# Patient Record
Sex: FEMALE | Race: WHITE | NOT HISPANIC OR LATINO | Employment: STUDENT | URBAN - METROPOLITAN AREA
[De-identification: names, ages, dates, MRNs, and addresses within clinical notes are randomized per-mention and may not be internally consistent; named-entity substitution may affect disease eponyms.]

---
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
## 2022-01-05 ENCOUNTER — APPOINTMENT (OUTPATIENT)
Dept: PLASTIC SURGERY | Facility: CLINIC | Age: 22
End: 2022-01-05

## 2022-01-10 ENCOUNTER — NON-APPOINTMENT (OUTPATIENT)
Age: 22
End: 2022-01-10

## 2022-01-10 ENCOUNTER — APPOINTMENT (OUTPATIENT)
Dept: OPHTHALMOLOGY | Facility: CLINIC | Age: 22
End: 2022-01-10
Payer: COMMERCIAL

## 2022-01-10 PROCEDURE — 99072 ADDL SUPL MATRL&STAF TM PHE: CPT

## 2022-01-10 PROCEDURE — 92014 COMPRE OPH EXAM EST PT 1/>: CPT

## 2023-01-07 ENCOUNTER — HOSPITAL ENCOUNTER (EMERGENCY)
Facility: OTHER | Age: 23
Discharge: HOME | End: 2023-01-07
Attending: EMERGENCY MEDICINE
Payer: COMMERCIAL

## 2023-01-07 VITALS
TEMPERATURE: 99 F | WEIGHT: 118 LBS | DIASTOLIC BLOOD PRESSURE: 81 MMHG | OXYGEN SATURATION: 98 % | RESPIRATION RATE: 18 BRPM | SYSTOLIC BLOOD PRESSURE: 125 MMHG | BODY MASS INDEX: 22.28 KG/M2 | HEART RATE: 74 BPM | HEIGHT: 61 IN

## 2023-01-07 DIAGNOSIS — Z20.3 NEED FOR POST EXPOSURE PROPHYLAXIS FOR RABIES: ICD-10-CM

## 2023-01-07 DIAGNOSIS — T14.8XXA ANIMAL BITE: Primary | ICD-10-CM

## 2023-01-07 PROCEDURE — 90375 RABIES IG IM/SC: CPT | Mod: JG | Performed by: PHYSICIAN ASSISTANT

## 2023-01-07 PROCEDURE — 63600175 PHARM REV CODE 636 W HCPCS: Mod: JG | Performed by: PHYSICIAN ASSISTANT

## 2023-01-07 PROCEDURE — 99284 EMERGENCY DEPT VISIT MOD MDM: CPT

## 2023-01-07 PROCEDURE — 90675 RABIES VACCINE IM: CPT | Mod: JG | Performed by: PHYSICIAN ASSISTANT

## 2023-01-07 PROCEDURE — 90471 IMMUNIZATION ADMIN: CPT | Performed by: PHYSICIAN ASSISTANT

## 2023-01-07 RX ADMIN — RABIES VACCINE 2.5 UNITS: KIT at 10:01

## 2023-01-07 RX ADMIN — RABIES IMMUNE GLOBULIN (HUMAN) 1080 UNITS: 300 INJECTION, SOLUTION INFILTRATION; INTRAMUSCULAR at 10:01

## 2023-01-07 NOTE — Clinical Note
"Eliud Quintanay" Jose was seen and treated in our emergency department on 1/7/2023.  She may return to school on 01/09/2023.      If you have any questions or concerns, please don't hesitate to call.      CLEMENTINE Donnelly"

## 2023-01-08 NOTE — ED PROVIDER NOTES
"  Source of History:  Patient     Chief complaint:  Animal Bite (Pt reports being bit by a Lemur to right hand in [facility] on 1/4/2022. Pt was seen by PCP in home state [place], given TDAP and abx. PCP would like patient to receive Rabies vaccine in ED. Denies pain to area.)      HPI:  Eliud Garcia is a 23 y.o. female presenting with  animal bite to right thumb.  Patient sustained bite from [place] while in [facility] few days ago.  She did note mild redness to the site and was evaluated in updated her tetanus and was started on Augmentin.  She discuss with her primary care and they recommended rabies prophylaxis 0 unsure of they animal status [place] this [place].  Patient denies any complaints at this time.  Patient is on a biologic for her asthma      This is the extent to the patients complaints today here in the emergency department.    ROS: As per HPI and below:  Constitutional: No fever.  No chills.  Eyes: No visual changes.   ENT: No sore throat. No ear pain.  Urinary: No abnormal urination.  MSK:  Animal bite to right thumb  Integument: No rashes or lesions.    Review of patient's allergies indicates:  No Known Allergies    PMH:  As per HPI and below:  Past Medical History:   Diagnosis Date    ADHD (attention deficit hyperactivity disorder)     Anxiety     Asthma     Depression      No past surgical history on file.    Social History     Tobacco Use    Smoking status: Never    Smokeless tobacco: Never       Physical Exam:    /81 (BP Location: Right arm, Patient Position: Sitting)   Pulse 74   Temp 98.9 °F (37.2 °C) (Oral)   Resp 18   Ht 5' 1" (1.549 m)   Wt 53.5 kg (118 lb)   SpO2 98%   BMI 22.30 kg/m²   Nursing note and vital signs reviewed.  Constitutional: No acute distress.  Eyes: No conjunctival injection.  Extraocular muscles are intact.  ENT: Normal phonation.  Musculoskeletal:  Small puncture jazmyne along the dorsal aspect of the right thumb with small area of erythema.  No abscess, fluctuance " or induration.  Full range of motion of all extremities.  Skin: No rashes seen.  Good turgor.  No abrasions.  No ecchymoses.  Psych: Appropriate, conversant.        I decided to obtain the patient's medical records.      Imaging Results    None         MDM:    Eliud Garcia 23 y.o. presented to the ED with c/o animal bite.  Physical exam revealsmall puncture jazmyne along the dorsal aspect of the right thumb with small area of erythema.  No abscess, fluctuance or induration.  Full range of motion of all extremities.    DDX:  And will bite, well-appearing, well-healing, local infection, cellulitis, abscess, rabies exposure    ED management:  Given unknown vaccine status of animal discuss initiating rabies prophylaxis which includes immunoglobulin and vaccine.  Immunoglobulin was injected near the site and this was tolerated well.  Remaining immunoglobulin injected into the deltoid by myself.  Remaining vaccine given by nurse.  Discussed continued following for subsequent vaccines on day 03/07/2014.  Suspect she may need for the 5th dose given her immunocompromise status with a biologic.  Will defer to Infectious Disease and have placed a referral.  She was informed she can likely obtain these vaccines in the vaccine clinic and or with infectious disease.    Impression/Plan: Patient informed of diagnosis  The primary encounter diagnosis was Animal bite. A diagnosis of Need for post exposure prophylaxis for rabies was also pertinent to this visit.  Patient will follow up with Primary.  Patient cautioned on when to return to ED.  Pt. Understands and agrees with current treatment plan                   Diagnostic Impression:    1. Animal bite    2. Need for post exposure prophylaxis for rabies         ED Disposition Condition    Discharge Stable            ED Prescriptions    None       Follow-up Information       Follow up With Specialties Details Why Contact Info Additional Information    [facility] 1st  Fl Infectious Diseases Schedule an appointment as soon as possible for a visit on 1/10/2023 for subsequent vaccinations Juany4 Anibal brijesh  [facility] [id number] [phone] Main Building, 1st floor near [place] entrance Please park in [facility]            Please pardon typos or dictation errors, as this note was transcribed using [employer] direct dictation software.      CLEMENTINE Donnelly  01/09/23 [id number]

## 2023-01-08 NOTE — DISCHARGE INSTRUCTIONS
You will need additional prophylaxis doses on day 3 (1/10), day 7 (1/14) and day 14 (1/21) and to discuss if they recommend 5th dose given your biologic use

## 2023-01-10 ENCOUNTER — TELEPHONE (OUTPATIENT)
Dept: INFECTIOUS DISEASES | Facility: CLINIC | Age: 23
End: 2023-01-10
Payer: COMMERCIAL

## 2023-01-10 ENCOUNTER — CLINICAL SUPPORT (OUTPATIENT)
Dept: INFECTIOUS DISEASES | Facility: CLINIC | Age: 23
End: 2023-01-10
Payer: COMMERCIAL

## 2023-01-10 DIAGNOSIS — Z20.3 NEED FOR POST EXPOSURE PROPHYLAXIS FOR RABIES: Primary | ICD-10-CM

## 2023-01-10 DIAGNOSIS — Z20.3 NEED FOR POST EXPOSURE PROPHYLAXIS FOR RABIES: ICD-10-CM

## 2023-01-10 PROCEDURE — 90675 RABIES VACCINE IM: CPT | Mod: JG,S$GLB,, | Performed by: INTERNAL MEDICINE

## 2023-01-10 PROCEDURE — 90471 RABIES VACCINE IM: ICD-10-PCS | Mod: S$GLB,,, | Performed by: INTERNAL MEDICINE

## 2023-01-10 PROCEDURE — 99999 PR PBB SHADOW E&M-EST. PATIENT-LVL I: ICD-10-PCS | Mod: PBBFAC,,,

## 2023-01-10 PROCEDURE — 90471 IMMUNIZATION ADMIN: CPT | Mod: S$GLB,,, | Performed by: INTERNAL MEDICINE

## 2023-01-10 PROCEDURE — 99999 PR PBB SHADOW E&M-EST. PATIENT-LVL I: CPT | Mod: PBBFAC,,,

## 2023-01-10 PROCEDURE — 90675 RABIES VACCINE IM: ICD-10-PCS | Mod: JG,S$GLB,, | Performed by: INTERNAL MEDICINE

## 2023-01-10 NOTE — PROGRESS NOTES
Patient received the rabies post exposure vaccine in the left deltoid. Pt tolerated well. Pt asked to wait in the clinic 15 minutes after injection in the event of an allergic reaction. Pt verbalized understanding. Pt left in NAD.

## 2023-01-13 ENCOUNTER — CLINICAL SUPPORT (OUTPATIENT)
Dept: INFECTIOUS DISEASES | Facility: CLINIC | Age: 23
End: 2023-01-13
Payer: COMMERCIAL

## 2023-01-13 DIAGNOSIS — Z20.3 NEED FOR POST EXPOSURE PROPHYLAXIS FOR RABIES: ICD-10-CM

## 2023-01-13 PROCEDURE — 90471 RABIES VACCINE IM: ICD-10-PCS | Mod: S$GLB,,, | Performed by: INTERNAL MEDICINE

## 2023-01-13 PROCEDURE — 99999 PR PBB SHADOW E&M-EST. PATIENT-LVL I: CPT | Mod: PBBFAC,,,

## 2023-01-13 PROCEDURE — 90675 RABIES VACCINE IM: CPT | Mod: JG,S$GLB,, | Performed by: INTERNAL MEDICINE

## 2023-01-13 PROCEDURE — 90675 RABIES VACCINE IM: ICD-10-PCS | Mod: JG,S$GLB,, | Performed by: INTERNAL MEDICINE

## 2023-01-13 PROCEDURE — 90471 IMMUNIZATION ADMIN: CPT | Mod: S$GLB,,, | Performed by: INTERNAL MEDICINE

## 2023-01-13 PROCEDURE — 99999 PR PBB SHADOW E&M-EST. PATIENT-LVL I: ICD-10-PCS | Mod: PBBFAC,,,

## 2023-01-13 NOTE — PROGRESS NOTES
Patient received 3rd post rabies vaccine IM to the right deltoid.  Tolerated well an left in NAD

## 2023-01-20 ENCOUNTER — CLINICAL SUPPORT (OUTPATIENT)
Dept: INFECTIOUS DISEASES | Facility: CLINIC | Age: 23
End: 2023-01-20
Payer: COMMERCIAL

## 2023-01-20 DIAGNOSIS — Z20.3 NEED FOR POST EXPOSURE PROPHYLAXIS FOR RABIES: ICD-10-CM

## 2023-01-20 PROCEDURE — 90471 IMMUNIZATION ADMIN: CPT | Mod: S$GLB,,, | Performed by: INTERNAL MEDICINE

## 2023-01-20 PROCEDURE — 99999 PR PBB SHADOW E&M-EST. PATIENT-LVL I: CPT | Mod: PBBFAC,,,

## 2023-01-20 PROCEDURE — 90675 RABIES VACCINE IM: ICD-10-PCS | Mod: JG,S$GLB,, | Performed by: INTERNAL MEDICINE

## 2023-01-20 PROCEDURE — 99999 PR PBB SHADOW E&M-EST. PATIENT-LVL I: ICD-10-PCS | Mod: PBBFAC,,,

## 2023-01-20 PROCEDURE — 90675 RABIES VACCINE IM: CPT | Mod: JG,S$GLB,, | Performed by: INTERNAL MEDICINE

## 2023-01-20 PROCEDURE — 90471 RABIES VACCINE IM: ICD-10-PCS | Mod: S$GLB,,, | Performed by: INTERNAL MEDICINE

## 2023-01-20 NOTE — PROGRESS NOTES
Patient received the Rabies vaccine in the right deltoid. Pt tolerated well. Pt asked to wait in the clinic 15 minutes after injection in the event of an allergic reaction. Pt verbalized understanding. Pt left in NAD.

## 2023-01-31 ENCOUNTER — TELEPHONE (OUTPATIENT)
Dept: EMERGENCY MEDICINE | Facility: OTHER | Age: 23
End: 2023-01-31
Payer: COMMERCIAL

## 2023-02-01 NOTE — TELEPHONE ENCOUNTER
----- Message from Soledad Connelly LPN sent at 1/31/2023  2:15 PM CST -----  She will be coming on Friday for her 28 day post exposure rabies due to being immune compromised.  Can you please place one more order for  rabies vaccine [id number]. Thanks

## 2023-02-10 ENCOUNTER — HOSPITAL ENCOUNTER (EMERGENCY)
Facility: OTHER | Age: 23
Discharge: HOME | End: 2023-02-10
Attending: EMERGENCY MEDICINE
Payer: COMMERCIAL

## 2023-02-10 VITALS
WEIGHT: 118 LBS | BODY MASS INDEX: 22.28 KG/M2 | DIASTOLIC BLOOD PRESSURE: 74 MMHG | OXYGEN SATURATION: 100 % | TEMPERATURE: 98 F | SYSTOLIC BLOOD PRESSURE: 118 MMHG | RESPIRATION RATE: 18 BRPM | HEIGHT: 61 IN | HEART RATE: 77 BPM

## 2023-02-10 DIAGNOSIS — M79.641 RIGHT HAND PAIN: Primary | ICD-10-CM

## 2023-02-10 DIAGNOSIS — S60.221A TRAUMATIC ECCHYMOSIS OF RIGHT HAND, INITIAL ENCOUNTER: ICD-10-CM

## 2023-02-10 LAB
B-HCG UR QL: NEGATIVE
CTP QC/QA: YES

## 2023-02-10 PROCEDURE — 81025 URINE PREGNANCY TEST: CPT | Performed by: NURSE PRACTITIONER

## 2023-02-10 PROCEDURE — 99283 EMERGENCY DEPT VISIT LOW MDM: CPT

## 2023-02-10 PROCEDURE — 25000003 PHARM REV CODE 250: Performed by: NURSE PRACTITIONER

## 2023-02-10 RX ORDER — ACETAMINOPHEN 325 MG/1
650 TABLET ORAL
Status: COMPLETED | OUTPATIENT
Start: 2023-02-10 | End: 2023-02-10

## 2023-02-10 RX ORDER — ACETAMINOPHEN 500 MG
500 TABLET ORAL EVERY 6 HOURS PRN
Qty: 20 TABLET | Refills: 0 | Status: SHIPPED | OUTPATIENT
Start: 2023-02-10

## 2023-02-10 RX ORDER — KETOROLAC TROMETHAMINE 10 MG/1
10 TABLET, FILM COATED ORAL EVERY 6 HOURS
Qty: 15 TABLET | Refills: 0 | Status: SHIPPED | OUTPATIENT
Start: 2023-02-10 | End: 2023-02-13

## 2023-02-10 RX ORDER — KETOROLAC TROMETHAMINE 10 MG/1
10 TABLET, FILM COATED ORAL
Status: COMPLETED | OUTPATIENT
Start: 2023-02-10 | End: 2023-02-10

## 2023-02-10 RX ADMIN — KETOROLAC TROMETHAMINE 10 MG: 10 TABLET, FILM COATED ORAL at 07:02

## 2023-02-10 RX ADMIN — ACETAMINOPHEN 650 MG: 325 TABLET, FILM COATED ORAL at 07:02

## 2023-02-10 NOTE — FIRST PROVIDER EVALUATION
" Emergency Department TeleTriage Encounter Note      CHIEF COMPLAINT    Chief Complaint   Patient presents with    Hand Injury     C/o right hand pain/swelling s/t injury x 1 wks with no improvement. Swelling and bruising noted to right hand. VSS       VITAL SIGNS   Initial Vitals [02/10/23 1623]   BP Pulse Resp Temp SpO2   120/76 71 17 98.2 °F (36.8 °C) 100 %      MAP       --            ALLERGIES    Review of patient's allergies indicates:  No Known Allergies    PROVIDER TRIAGE NOTE  This is a teletriage evaluation of a 23 y.o. female presenting to the ED complaining of right hand injury.  Was playing "bloody knuckles" with a friend and did not realize how much her hand hurt afterwards.  Reports continued pain, swelling, and bruising.     Well-appearing.  Right hand swelling and bruising.     Initial orders will be placed and care will be transferred to an alternate provider when patient is roomed for a full evaluation. Any additional orders and the final disposition will be determined by that provider.         ORDERS  Labs Reviewed   POCT URINE PREGNANCY       ED Orders (720h ago, onward)      Start Ordered     Status Ordering Provider    02/10/23 1630 02/10/23 1629  POCT urine pregnancy  Once         Ordered JUAN LUIS BAUTISTA    02/10/23 1630 02/10/23 1629  X-Ray Hand 3 view Right  1 time imaging         Ordered JUAN LUIS BAUTISTA              Virtual Visit Note: The provider triage portion of this emergency department evaluation and documentation was performed via [employer], a HIPAA-compliant telemedicine application, in concert with a tele-presenter in the room. A face to face patient evaluation with one of my colleagues will occur once the patient is placed in an emergency department room.      DISCLAIMER: This note was prepared with [employer] voice recognition transcription software. Garbled syntax, mangled pronouns, and other bizarre constructions may be attributed to that software " system.

## 2023-02-11 NOTE — ED PROVIDER NOTES
"SCRIBE #1 NOTE: I, Everardo Hauser, am scribing for, and in the presence of,  Sabi Sheriff NP.       Source of History:  The patient.     Chief complaint:  Hand Injury (C/o right hand pain/swelling s/t injury x 1 wks with no improvement. Swelling and bruising noted to right hand. VSS)      HPI:  Eliud Garcia is a 23 y.o. female presenting with pain, swelling, and bruising to the back of her right hand for the past week. The patient states she was playing a drinking game that involved repeatedly hitting knuckles against the the back of her hand until one person gives up. She describes her pain as a constant dull pain but she notes that her hand is very tender to touch. She also states that she feels pain when she moves her fingers. She reports taking 400 mg Ibuprofen with moderate relief.     This is the extent to the patients complaints today here in the emergency department.    ROS: As per HPI and below:  General: No fever.  No chills.  Eyes: No visual changes.  ENT: No sore throat. No ear pain  Head: No headache.    Respiratory: No shortness of breath.  Cardiovascular: No chest pain.  Abdomen: No abdominal pain.  No nausea or vomiting.  Genito-Urinary: No abnormal urination.  Neurologic: No focal weakness.  No numbness.  MSK: No back pain. +Right hand pain and swelling.   Integument: No rashes or lesions. +Bruising to right hand.   Hematologic: No easy bruising.  Endocrine: No excessive thirst or urination.      Review of patient's allergies indicates:  No Known Allergies    PMH:  As per HPI and below:  Past Medical History:   Diagnosis Date    ADHD (attention deficit hyperactivity disorder)     Anxiety     Asthma     Depression      No past surgical history on file.    Social History     Tobacco Use    Smoking status: Never    Smokeless tobacco: Never       Physical Exam:    /76 (BP Location: Left arm, Patient Position: Sitting)   Pulse 71   Temp 98.2 °F (36.8 °C) (Oral)   Resp 17   Ht 5' 1" " (1.549 m)   Wt 53.5 kg (118 lb)   SpO2 100%   BMI 22.30 kg/m²   Nursing note and vital signs reviewed.    Appearance: No acute distress.  Eyes: No conjunctival injection.  Neck: No deformity.   ENT: Oropharynx clear. No stridor.   Chest/ Respiratory: Clear to auscultation bilaterally. Good air movement.  No wheezes.  No rhonchi. No rales. No accessory muscle use.  Cardiovascular: Regular rate and rhythm. No murmurs. No gallops. No rubs.  Abdomen: Soft. Not distended. Nontender. No guarding. No rebound. Non-peritoneal.  Musculoskeletal: Normal finger and thumb adduction. Normal  strength. Normal ability to make fist. Full flexion and extension of all digits against resistance. No snuffbox tenderness. Full ROM of wrist. Mild edema overlying dorsum of right hand and MCP of fingers 3 and 4.   Skin: Ecchymosis to dorsum of right hand. No rashes seen. Good turgor. No abrasions.   Neurologic: Motor intact. Sensation intact. Cerebellar intact. Cranial nerves intact.  Mental Status: Alert and oriented x 3. Appropriate, conversant.      Imaging:    Imaging Results              X-Ray Hand 3 view Right (Final result)  Result time 02/10/23 17:11:51      Final result by Snow Nunez MD (02/10/23 17:11:51)                   Impression:      No acute displaced fracture seen.      Electronically signed by: Snow Nunez MD  Date:    02/10/2023  Time:    17:11               Narrative:    EXAMINATION:  XR HAND COMPLETE 3 VIEW RIGHT    CLINICAL HISTORY:  hand injury;    TECHNIQUE:  PA, lateral, and oblique views of the right hand were performed.    COMPARISON:  None    FINDINGS:  No evidence of acute displaced fracture, dislocation, or osseous destructive process.  No radiopaque retained foreign body seen.                                        Labs:  Results for orders placed or performed during the hospital encounter of 02/10/23   POCT urine pregnancy   Result Value Ref Range    POC Preg Test, Ur Negative Negative      Acceptable Yes                Differential Dx:  Differential Diagnosis includes, but is not limited to:  Fracture, dislocation, compartment syndrome, nerve injury/palsy, vascular injury, DVT, rhabdomyolysis, hemarthrosis, septic joint, cellulitis, bursitis, muscle strain, ligament tear/sprain, laceration, foreign body, abrasion, soft tissue contusion, osteoarthritis.    MDM:    Urgent evaluation 23-year-old female that presents with continued pain to the dorsum of her right hand after being repeatedly hit with someone's knuckles during a drinking game a week ago.  Patient with ecchymosis and mild edema to the dorsum of the hand.  Pain elicited with palpation, however patient has full range of motion of the hand.  Neurovascularly intact.  No acute fracture dislocation identified on CXR.  Will treat with Toradol and Tylenol and instructed patient to take these at home as well.  Will have her follow-up with hand surgery.  Patient amenable to this plan and discharged in good condition. Patient educated on signs and symptoms to monitor for and when to return to ED. Patient verbalized understanding agrees with treatment plan. All questions and concerns addressed.                    Scribe Attestation:   Scribe #1: I performed the above scribed service and the documentation accurately describes the services I performed. I attest to the accuracy of the note.    Attestation for Scribe: I, Sabi Sheriff, reviewed documentation as scribed in my presence, which is both accurate and complete.    Diagnostic Impression:    1. Right hand pain    2. Traumatic ecchymosis of right hand, initial encounter         ED Disposition Condition    Discharge Good            ED Prescriptions       Medication Sig Dispense Start Date End Date Auth. Provider    ketorolac (TORADOL) 10 mg tablet Take 1 tablet (10 mg total) by mouth every 6 (six) hours. for 3 days 15 tablet 2/10/2023 2/13/2023 Sabi Sheriff, LINDA    acetaminophen  (TYLENOL) 500 MG tablet Take 1 tablet (500 mg total) by mouth every 6 (six) hours as needed for Pain. 20 tablet 2/10/2023 -- Sabi Sheriff NP          Follow-up Information       Follow up With Specialties Details Why Contact Info Additional Information    [facility] Schedule an appointment as soon as possible for a visit   [address] [facility] [address]  [facility] [address]  [phone] Hand Rehab, 9th Floor Please park in [address] and use [address] elevators             Sabi Sheriff NP  02/10/23 [id number]

## 2023-02-11 NOTE — ED TRIAGE NOTES
Pt presents to the ED with c/o right hand pain and swelling x 1 week. Pt reports injury to right hand without improvement. Pt with +ROM noted, 2+ pulses.

## 2023-02-13 ENCOUNTER — TELEPHONE (OUTPATIENT)
Dept: ORTHOPEDICS | Facility: CLINIC | Age: 23
End: 2023-02-13
Payer: COMMERCIAL

## 2024-10-02 DIAGNOSIS — Z86.59 PERSONAL HISTORY OF OTHER MENTAL AND BEHAVIORAL DISORDERS: ICD-10-CM

## 2024-10-02 DIAGNOSIS — G47.9 SLEEP DISORDER, UNSPECIFIED: ICD-10-CM

## 2024-10-02 RX ORDER — ESCITALOPRAM OXALATE 10 MG/1
10 TABLET ORAL DAILY
Refills: 0 | Status: ACTIVE | COMMUNITY

## 2024-10-02 RX ORDER — LISDEXAMFETAMINE DIMESYLATE 30 MG/1
30 CAPSULE ORAL DAILY
Refills: 0 | Status: ACTIVE | COMMUNITY

## 2024-10-02 RX ORDER — BUDESONIDE AND FORMOTEROL FUMARATE DIHYDRATE 160; 4.5 UG/1; UG/1
160-4.5 AEROSOL RESPIRATORY (INHALATION) TWICE DAILY
Refills: 0 | Status: ACTIVE | COMMUNITY

## 2024-10-02 RX ORDER — DEXTROAMPHETAMINE SACCHARATE, AMPHETAMINE ASPARTATE, DEXTROAMPHETAMINE SULFATE, AND AMPHETAMINE SULFATE 3.125; 3.125; 3.125; 3.125 MG/1; MG/1; MG/1; MG/1
12.5 TABLET ORAL DAILY
Refills: 0 | Status: ACTIVE | COMMUNITY

## 2024-10-02 RX ORDER — ALBUTEROL SULFATE 90 UG/1
108 AEROSOL, METERED RESPIRATORY (INHALATION)
Refills: 0 | Status: ACTIVE | COMMUNITY

## 2024-10-03 ENCOUNTER — RESULT CHARGE (OUTPATIENT)
Age: 24
End: 2024-10-03

## 2024-10-04 ENCOUNTER — NON-APPOINTMENT (OUTPATIENT)
Age: 24
End: 2024-10-04

## 2024-10-04 ENCOUNTER — APPOINTMENT (OUTPATIENT)
Dept: CARDIOLOGY | Facility: CLINIC | Age: 24
End: 2024-10-04
Payer: COMMERCIAL

## 2024-10-04 VITALS
BODY MASS INDEX: 21.71 KG/M2 | SYSTOLIC BLOOD PRESSURE: 112 MMHG | DIASTOLIC BLOOD PRESSURE: 80 MMHG | WEIGHT: 118 LBS | HEART RATE: 85 BPM | OXYGEN SATURATION: 97 % | HEIGHT: 62 IN

## 2024-10-04 DIAGNOSIS — Z82.49 FAMILY HISTORY OF ISCHEMIC HEART DISEASE AND OTHER DISEASES OF THE CIRCULATORY SYSTEM: ICD-10-CM

## 2024-10-04 DIAGNOSIS — R60.9 EDEMA, UNSPECIFIED: ICD-10-CM

## 2024-10-04 DIAGNOSIS — E74.31 SUCRASE-ISOMALTASE DEFICIENCY: ICD-10-CM

## 2024-10-04 DIAGNOSIS — Z87.09 PERSONAL HISTORY OF OTHER DISEASES OF THE RESPIRATORY SYSTEM: ICD-10-CM

## 2024-10-04 DIAGNOSIS — E78.5 HYPERLIPIDEMIA, UNSPECIFIED: ICD-10-CM

## 2024-10-04 DIAGNOSIS — Z83.2 FAMILY HISTORY OF DISEASES OF THE BLOOD AND BLOOD-FORMING ORGANS AND CERTAIN DISORDERS INVOLVING THE IMMUNE MECHANISM: ICD-10-CM

## 2024-10-04 DIAGNOSIS — Z83.438 FAMILY HISTORY OF OTHER DISORDER OF LIPOPROTEIN METABOLISM AND OTHER LIPIDEMIA: ICD-10-CM

## 2024-10-04 DIAGNOSIS — Z86.79 PERSONAL HISTORY OF OTHER DISEASES OF THE CIRCULATORY SYSTEM: ICD-10-CM

## 2024-10-04 DIAGNOSIS — Z83.3 FAMILY HISTORY OF DIABETES MELLITUS: ICD-10-CM

## 2024-10-04 DIAGNOSIS — Z82.0 FAMILY HISTORY OF EPILEPSY AND OTHER DISEASES OF THE NERVOUS SYSTEM: ICD-10-CM

## 2024-10-04 DIAGNOSIS — Z78.9 OTHER SPECIFIED HEALTH STATUS: ICD-10-CM

## 2024-10-04 DIAGNOSIS — Z82.3 FAMILY HISTORY OF STROKE: ICD-10-CM

## 2024-10-04 PROCEDURE — 99204 OFFICE O/P NEW MOD 45 MIN: CPT

## 2024-10-04 PROCEDURE — 93000 ELECTROCARDIOGRAM COMPLETE: CPT

## 2024-10-04 RX ORDER — OMALIZUMAB 202.5 MG/1.4ML
INJECTION, SOLUTION SUBCUTANEOUS
Refills: 0 | Status: ACTIVE | COMMUNITY

## 2024-10-04 RX ORDER — HYDROXYZINE HYDROCHLORIDE 25 MG/1
25 TABLET ORAL
Refills: 0 | Status: ACTIVE | COMMUNITY

## 2024-10-04 RX ORDER — TRAZODONE HYDROCHLORIDE 50 MG/1
50 TABLET ORAL
Refills: 0 | Status: DISCONTINUED | COMMUNITY
End: 2024-10-04

## 2024-10-04 RX ORDER — ETONOGESTREL 68 MG/1
68 IMPLANT SUBCUTANEOUS
Refills: 0 | Status: ACTIVE | COMMUNITY

## 2024-10-04 NOTE — HISTORY OF PRESENT ILLNESS
[FreeTextEntry1] : DAYRON QUESADA  is a 24 year old  F  Referred by her pediatrician.  History of asthma anxiety depression ADHD.  Recently graduated to [employer].  Works in digital marketing.  Goes to the gym 4 days a week.  Also has history of Raynaud's and peripheral edema.  Longstanding hyperlipidemia.  Recently her LDL was over 200 and total cholesterol over 300.  She reports longstanding hyperlipidemia.  There is a family history of hyperlipidemia.  Paternal grandfather had a cardiac event in his 40s.  Both her uncle and father have elevated cholesterol and started medication in the 20s.  Her mother's history is notable for hypercoagulable disorder.  She makes an active effort to eat healthy.  She takes an intradermal contraceptive.  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  She also has a congenital sucrase isomaltase deficiency.  Hyperlipidemia.  Likely familial.  Genetic testing.  Nutritional counseling.  Note above deficiency.  Reviewed long-term LDL goal and threshold for treatment.  Discussed future molecular therapies follow-up blood work.  Risk enhancers included.  If LDL remains elevated would initiate statin therapy.  Understands need for contraception.  Last blood work has a TSH 2.3 hemoglobin 15.7 creatinine 0.8 total cholesterol 325

## 2024-11-14 ENCOUNTER — APPOINTMENT (OUTPATIENT)
Dept: CARDIOLOGY | Facility: CLINIC | Age: 24
End: 2024-11-14
Payer: COMMERCIAL

## 2024-11-14 PROCEDURE — 99244 OFF/OP CNSLTJ NEW/EST MOD 40: CPT

## 2024-11-18 ENCOUNTER — APPOINTMENT (OUTPATIENT)
Dept: NUTRITION | Facility: CLINIC | Age: 24
End: 2024-11-18
Payer: COMMERCIAL

## 2024-11-18 PROCEDURE — 97802 MEDICAL NUTRITION INDIV IN: CPT | Mod: 95

## 2024-12-25 PROBLEM — F10.90 ALCOHOL USE: Status: ACTIVE | Noted: 2024-10-04

## 2025-01-17 ENCOUNTER — APPOINTMENT (OUTPATIENT)
Dept: CARDIOLOGY | Facility: CLINIC | Age: 25
End: 2025-01-17
Payer: COMMERCIAL

## 2025-01-17 VITALS
SYSTOLIC BLOOD PRESSURE: 98 MMHG | OXYGEN SATURATION: 98 % | DIASTOLIC BLOOD PRESSURE: 58 MMHG | HEART RATE: 94 BPM | BODY MASS INDEX: 21.71 KG/M2 | HEIGHT: 62 IN | WEIGHT: 118 LBS

## 2025-01-17 DIAGNOSIS — Z82.49 FAMILY HISTORY OF ISCHEMIC HEART DISEASE AND OTHER DISEASES OF THE CIRCULATORY SYSTEM: ICD-10-CM

## 2025-01-17 DIAGNOSIS — E78.5 HYPERLIPIDEMIA, UNSPECIFIED: ICD-10-CM

## 2025-01-17 DIAGNOSIS — Z71.89 OTHER SPECIFIED COUNSELING: ICD-10-CM

## 2025-01-17 PROCEDURE — 99213 OFFICE O/P EST LOW 20 MIN: CPT

## 2025-02-12 ENCOUNTER — APPOINTMENT (OUTPATIENT)
Dept: CARDIOLOGY | Facility: CLINIC | Age: 25
End: 2025-02-12
Payer: COMMERCIAL

## 2025-02-12 PROCEDURE — 99215 OFFICE O/P EST HI 40 MIN: CPT | Mod: 95

## 2025-03-24 LAB
APO LP(A) SERPL-MCNC: <9
HBA1C MFR BLD HPLC: 5.1

## 2025-03-26 ENCOUNTER — NON-APPOINTMENT (OUTPATIENT)
Age: 25
End: 2025-03-26

## 2025-03-26 RX ORDER — ATORVASTATIN CALCIUM 10 MG/1
10 TABLET, FILM COATED ORAL
Qty: 90 | Refills: 1 | Status: ACTIVE | COMMUNITY
Start: 2025-03-26 | End: 1900-01-01

## 2025-09-19 ENCOUNTER — RX RENEWAL (OUTPATIENT)
Age: 25
End: 2025-09-19